# Patient Record
Sex: FEMALE | Race: WHITE | NOT HISPANIC OR LATINO | URBAN - METROPOLITAN AREA
[De-identification: names, ages, dates, MRNs, and addresses within clinical notes are randomized per-mention and may not be internally consistent; named-entity substitution may affect disease eponyms.]

---
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
## 2023-04-26 ENCOUNTER — TELEPHONE (OUTPATIENT)
Dept: PEDIATRICS CLINIC | Facility: CLINIC | Age: 23
End: 2023-04-26

## 2023-05-08 NOTE — TELEPHONE ENCOUNTER
05/08/23 12:37 PM        The office's request has been received, reviewed, and the patient chart updated  The PCP has successfully been removed with a patient attribution note  This message will now be completed          Thank you  Silas Alatorre

## 2023-06-21 ENCOUNTER — OFFICE VISIT (OUTPATIENT)
Dept: FAMILY MEDICINE CLINIC | Facility: CLINIC | Age: 23
End: 2023-06-21
Payer: COMMERCIAL

## 2023-06-21 VITALS
HEIGHT: 69 IN | BODY MASS INDEX: 20.44 KG/M2 | HEART RATE: 77 BPM | DIASTOLIC BLOOD PRESSURE: 70 MMHG | OXYGEN SATURATION: 97 % | SYSTOLIC BLOOD PRESSURE: 118 MMHG | TEMPERATURE: 97.2 F | RESPIRATION RATE: 18 BRPM | WEIGHT: 138 LBS

## 2023-06-21 DIAGNOSIS — B96.89 ACUTE BACTERIAL BRONCHITIS: ICD-10-CM

## 2023-06-21 DIAGNOSIS — J20.8 ACUTE BACTERIAL BRONCHITIS: ICD-10-CM

## 2023-06-21 DIAGNOSIS — Z00.01 ENCOUNTER FOR WELL ADULT EXAM WITH ABNORMAL FINDINGS: Primary | ICD-10-CM

## 2023-06-21 PROCEDURE — 99385 PREV VISIT NEW AGE 18-39: CPT | Performed by: NURSE PRACTITIONER

## 2023-06-21 RX ORDER — ALBUTEROL SULFATE 90 UG/1
2 AEROSOL, METERED RESPIRATORY (INHALATION) EVERY 4 HOURS PRN
Qty: 18 G | Refills: 3 | Status: SHIPPED | OUTPATIENT
Start: 2023-06-21

## 2023-06-21 RX ORDER — AZITHROMYCIN 250 MG/1
TABLET, FILM COATED ORAL
Qty: 6 TABLET | Refills: 0 | Status: SHIPPED | OUTPATIENT
Start: 2023-06-21 | End: 2023-06-25

## 2023-06-21 NOTE — PROGRESS NOTES
BMI Counseling: Body mass index is 20 38 kg/m²  The BMI is above normal  Nutrition recommendations include reducing portion sizes  Exercise recommendations include moderate aerobic physical activity for 150 minutes/week  ADULT ANNUAL [address] [facility]    NAME: Janet Oliver  AGE: 23 y o  SEX: female  : 2000     DATE: 2023     Assessment and Plan:     Problem List Items Addressed This Visit    None  Visit Diagnoses     Encounter for well adult exam with abnormal findings    -  Primary    Acute bacterial bronchitis        Relevant Medications    albuterol (ProAir HFA) 90 mcg/act inhaler    azithromycin (ZITHROMAX) 250 mg tablet          Immunizations and preventive care screenings were discussed with patient today  Appropriate education was printed on patient's after visit summary  Counseling:  Alcohol/drug use: discussed moderation in alcohol intake, the recommendations for healthy alcohol use, and avoidance of illicit drug use  Dental Health: discussed importance of regular tooth brushing, flossing, and dental visits  Injury prevention: discussed safety/seat belts, safety helmets, smoke detectors, carbon dioxide detectors, and smoking near bedding or upholstery  Sexual health: discussed sexually transmitted diseases, partner selection, use of condoms, avoidance of unintended pregnancy, and contraceptive alternatives  · Exercise: the importance of regular exercise/physical activity was discussed  Recommend exercise 3-5 times per week for at least 30 minutes  Depression Screening and Follow-up Plan: Patient was screened for depression during today's encounter  They screened negative with a PHQ-2 score of 0  No follow-ups on file  Chief Complaint:     Chief Complaint   Patient presents with   • Establish Care     Cough has baeen for the past month         History of Present Illness:     Adult Annual Physical Patient here for a comprehensive physical exam  The patient reports problems - URI like symptoms ongoing for one month  She reports taking cold and allergy medication  She reports congestion , chest congestion, no fever or chills  She is attending [employer]  Diet and Physical Activity  · Diet/Nutrition: well balanced diet  · Exercise: moderate cardiovascular exercise and strength training exercises  Depression Screening  PHQ-2/9 Depression Screening    Little interest or pleasure in doing things: 0 - not at all  Feeling down, depressed, or hopeless: 0 - not at all  PHQ-2 Score: 0  PHQ-2 Interpretation: Negative depression screen       General Health  · Sleep: gets 7-8 hours of sleep on average  · Hearing: normal - bilateral   · Vision: wears glasses  · Dental: regular dental visits  /GYN Health  · Last menstrual period: normal, regular  · Contraceptive method: none  · History of STDs?: no      Review of Systems:     Review of Systems   Constitutional: Negative for activity change, chills, fatigue and fever  HENT: Positive for congestion  Negative for ear discharge, ear pain, sinus pressure, sinus pain, sore throat, tinnitus and trouble swallowing  Eyes: Negative for photophobia, pain, discharge, itching and visual disturbance  Respiratory: Positive for cough  Negative for chest tightness, shortness of breath and wheezing  Cardiovascular: Negative for chest pain and leg swelling  Gastrointestinal: Negative for abdominal distention, abdominal pain, constipation, diarrhea, nausea and vomiting  Endocrine: Negative for polydipsia, polyphagia and polyuria  Genitourinary: Negative for dysuria and frequency  Musculoskeletal: Negative for arthralgias, myalgias, neck pain and neck stiffness  Skin: Negative for color change  Neurological: Negative for dizziness, syncope, weakness, numbness and headaches  Hematological: Does not bruise/bleed easily  Psychiatric/Behavioral: Negative for behavioral problems, confusion, self-injury, sleep disturbance and suicidal ideas  The patient is not nervous/anxious         Past Medical History:     Past Medical History:   Diagnosis Date   • Allergic rhinitis     LAST ASSESSED [id number]   • Asthma, well controlled     LAST ASSESSED 02OCT2014   • History of pneumonia     LAST ASSESSED [id number]   • Migraine     LAST ASSESSED [id number]   • Mild scoliosis     LAST ASSESSED NOV 2 2015   • Peanut allergy     LAST ASSESSED [id number]      Past Surgical History:     Past Surgical History:   Procedure Laterality Date   • NO PAST SURGERIES        Social History:     Social History     Socioeconomic History   • Marital status: Single     Spouse name: None   • Number of children: None   • Years of education: None   • Highest education level: None   Occupational History   • None   Tobacco Use   • Smoking status: Never   • Smokeless tobacco: Never   Vaping Use   • Vaping Use: Never used   Substance and Sexual Activity   • Alcohol use: No   • Drug use: No   • Sexual activity: Never   Other Topics Concern   • None   Social History Narrative        OLDER BROTHERS  1 moved to [place] after college  AND ONE IN THE [employer]     LIVES WITH PARENTS         Social Determinants of Health     Financial Resource Strain: Not on file   Food Insecurity: Not on file   Transportation Needs: Not on file   Physical Activity: Not on file   Stress: Not on file   Social Connections: Not on file   Intimate Partner Violence: Not on file   Housing Stability: Not on file      Family History:     Family History   Problem Relation Age of Onset   • Hyperlipidemia Father    • Hypertension Father    • Aneurysm Cousin         AS A TEENAGER   • Diabetes Family    • Hypertension Mother    • Hyperlipidemia Mother    • No Known Problems Brother    • No Known Problems Brother    • Addiction problem Paternal Grandfather         alcohol   • Mental illness Neg Hx       Current "Medications:     Current Outpatient Medications   Medication Sig Dispense Refill   • albuterol (ProAir HFA) 90 mcg/act inhaler Inhale 2 puffs every 4 (four) hours as needed for wheezing 18 g 3   • azithromycin (ZITHROMAX) 250 mg tablet Take 2 tablets today then 1 tablet daily x 4 days 6 tablet 0   • clindamycin-benzoyl peroxide (BENZACLIN) gel APPLY TO FACE ONCE A DAY     • EPINEPHrine (EPIPEN) 0 3 mg/0 3 mL SOAJ Inject 0 3 mL (0 3 mg total) into a muscle once for 1 dose 2 2 packs, generic OK 2 each 1   • loratadine (CLARITIN) 10 mg tablet 1 tab(s)     • tretinoin (REFISSA) 0 05 % cream Apply topically daily at bedtime 45 g 0     No current facility-administered medications for this visit  Allergies: Allergies   Allergen Reactions   • Amoxicillin-Pot Clavulanate    • Peanut Oil - Food Allergy Other (See Comments)   • Shellfish-Derived Products - Food Allergy Tongue Swelling      Physical Exam:     /70 (BP Location: Left arm, Patient Position: Sitting, Cuff Size: Standard)   Pulse 77   Temp (!) 97 2 °F (36 2 °C) (Tympanic)   Resp 18   Ht 5' 9\" (1 753 m)   Wt 62 6 kg (138 lb)   SpO2 97%   BMI 20 38 kg/m²     Physical Exam  Vitals and nursing note reviewed  Constitutional:       Appearance: Normal appearance  She is well-developed  HENT:      Head: Normocephalic and atraumatic  Right Ear: Tympanic membrane normal       Left Ear: Tympanic membrane normal       Nose: Congestion present  Mouth/Throat:      Mouth: Mucous membranes are moist    Eyes:      Extraocular Movements: Extraocular movements intact  Pupils: Pupils are equal, round, and reactive to light  Cardiovascular:      Rate and Rhythm: Normal rate and regular rhythm  Pulses: Normal pulses  Heart sounds: Normal heart sounds  Pulmonary:      Breath sounds: Wheezing present  No rhonchi  Comments: Moist cough   Abdominal:      General: There is no distension  Tenderness:  There is no abdominal " tenderness  Musculoskeletal:         General: No swelling, tenderness, deformity or signs of injury  Normal range of motion  Cervical back: Normal range of motion and neck supple  Right lower leg: No edema  Left lower leg: No edema  Skin:     General: Skin is warm  Findings: No bruising, erythema, lesion or rash  Neurological:      General: No focal deficit present  Mental Status: She is alert and oriented to person, place, and time  Psychiatric:         Mood and Affect: Mood normal          Behavior: Behavior normal          Thought Content:  Thought content normal          Judgment: Judgment normal           Barbara Duarte, [address]

## 2023-11-25 ENCOUNTER — OFFICE VISIT (OUTPATIENT)
Dept: URGENT CARE | Facility: CLINIC | Age: 23
End: 2023-11-25
Payer: COMMERCIAL

## 2023-11-25 VITALS
WEIGHT: 134 LBS | DIASTOLIC BLOOD PRESSURE: 81 MMHG | BODY MASS INDEX: 19.79 KG/M2 | TEMPERATURE: 98.1 F | OXYGEN SATURATION: 98 % | RESPIRATION RATE: 16 BRPM | SYSTOLIC BLOOD PRESSURE: 133 MMHG | HEART RATE: 108 BPM

## 2023-11-25 DIAGNOSIS — J02.0 STREP PHARYNGITIS: Primary | ICD-10-CM

## 2023-11-25 DIAGNOSIS — J02.9 SORE THROAT: ICD-10-CM

## 2023-11-25 LAB — S PYO AG THROAT QL: POSITIVE

## 2023-11-25 PROCEDURE — G0382 LEV 3 HOSP TYPE B ED VISIT: HCPCS | Performed by: ORTHOPAEDIC SURGERY

## 2023-11-25 PROCEDURE — 87880 STREP A ASSAY W/OPTIC: CPT | Performed by: ORTHOPAEDIC SURGERY

## 2023-11-25 RX ORDER — GUAIFENESIN 600 MG/1
1200 TABLET, EXTENDED RELEASE ORAL EVERY 12 HOURS SCHEDULED
COMMUNITY

## 2023-11-25 RX ORDER — AZITHROMYCIN 250 MG/1
TABLET, FILM COATED ORAL
Qty: 6 TABLET | Refills: 0 | Status: SHIPPED | OUTPATIENT
Start: 2023-11-25 | End: 2023-11-29

## 2023-11-25 NOTE — PATIENT INSTRUCTIONS
Strep group A was positive today in clinic. Please take your prescription as prescribed.    For pain relief you may try:  Warm water and salt gargles  Chloraseptic spray  Cepacol lozenges  "Throat Coat" tea  Over-the-counter Tylenol/ibuprofen for pain and fever  Stay well hydrated and get plenty of rest  Follow up with your PCP in 3-5 days  Proceed to ER if symptoms worsen

## 2023-11-26 NOTE — PROGRESS NOTES
[facility]        NAME: Madelin Gardner is a 26 y.o. female  : 2000    MRN: [id number]  DATE: 2023  TIME: 4:44 PM    Assessment and Plan   Strep pharyngitis [J02.0]  1. Strep pharyngitis  azithromycin (ZITHROMAX) 250 mg tablet      2. Sore throat  POCT rapid strepA        POCT strep positive. Patient Instructions     Strep group A was positive today in clinic. Please take your prescription as prescribed. For pain relief you may try:  Warm water and salt gargles  Chloraseptic spray  Cepacol lozenges  "Throat Coat" tea  Over-the-counter Tylenol/ibuprofen for pain and fever  Stay well hydrated and get plenty of rest  Follow up with your PCP in 3-5 days  Proceed to ER if symptoms worsen    Chief Complaint     Chief Complaint   Patient presents with    Sore Throat     Sore throat beginning of oct. Tested positive for strep B, prescribed antibiotic. Still had sore throat beginning of Nov, tested positive for strep B on , was prescribed ABX and completed course. Felt sore throat was improving but last few days has sore throat again. History of Present Illness       26 YOF presents to the urgent care for evaluation of a sore throat. The patient was last treated for strep B on 2023, and notes another strep B infection just prior to that. She was treated with Keflex first and then Doxycycline, as she is allergic to [facility]. The patient states that both times her symptoms had completely resolved. Her sore throat today started this past week. She notes 102F fever and chills this morning. No n/v/d. No significant cough. For symptom relief she has used Mucinex and allergy medication. The patient is in college. Review of Systems   Review of Systems   Constitutional:  Positive for chills and fever. HENT:  Positive for congestion and sore throat. Negative for ear pain. Eyes:  Negative for pain and visual disturbance.    Respiratory:  Negative for cough and shortness of breath. Cardiovascular:  Negative for chest pain and palpitations. Gastrointestinal:  Negative for abdominal pain and vomiting. Genitourinary:  Negative for dysuria and hematuria. Musculoskeletal:  Negative for arthralgias and back pain. Skin:  Negative for color change and rash. Neurological:  Negative for dizziness, seizures, syncope, weakness, light-headedness and headaches. All other systems reviewed and are negative.         Current Medications       Current Outpatient Medications:     albuterol (ProAir HFA) 90 mcg/act inhaler, Inhale 2 puffs every 4 (four) hours as needed for wheezing, Disp: 18 g, Rfl: 3    azithromycin (ZITHROMAX) 250 mg tablet, Take 2 tablets today then 1 tablet daily x 4 days, Disp: 6 tablet, Rfl: 0    EPINEPHrine (EPIPEN) 0.3 mg/0.3 mL SOAJ, Inject 0.3 mL (0.3 mg total) into a muscle once for 1 dose 2 2 packs, generic OK, Disp: 2 each, Rfl: 1    guaiFENesin (MUCINEX) 600 mg 12 hr tablet, Take 1,200 mg by mouth every 12 (twelve) hours, Disp: , Rfl:     tretinoin (REFISSA) 0.05 % cream, Apply topically daily at bedtime, Disp: 45 g, Rfl: 0    clindamycin-benzoyl peroxide (BENZACLIN) gel, APPLY TO FACE ONCE A DAY, Disp: , Rfl:     loratadine (CLARITIN) 10 mg tablet, 1 tab(s) (Patient not taking: Reported on 11/25/2023), Disp: , Rfl:     Current Allergies     Allergies as of 11/25/2023 - Reviewed 11/25/2023   Allergen Reaction Noted    Amoxicillin-pot clavulanate  04/10/2014    Peanut oil - food allergy Other (See Comments) 12/16/2013    Shellfish-derived products - food allergy Tongue Swelling 06/21/2023            The following portions of the patient's history were reviewed and updated as appropriate: allergies, current medications, past family history, past medical history, past social history, past surgical history and problem list.     Past Medical History:   Diagnosis Date    Allergic rhinitis     LAST ASSESSED [id number]    Asthma, well controlled     LAST ASSESSED [id number]    History of pneumonia     LAST ASSESSED [id number]    Migraine     LAST ASSESSED [id number]    Mild scoliosis     LAST ASSESSED NOV 2 2015    Peanut allergy     LAST ASSESSED [id number]       Past Surgical History:   Procedure Laterality Date    NO PAST SURGERIES      WISDOM TOOTH EXTRACTION  08/2023       Family History   Problem Relation Age of Onset    Hyperlipidemia Father     Hypertension Father     Aneurysm Cousin         AS A TEENAGER    Diabetes Family     Hypertension Mother     Hyperlipidemia Mother     No Known Problems Brother     No Known Problems Brother     Addiction problem Paternal Grandfather         alcohol    Mental illness Neg Hx          Medications have been verified. Objective   /81   Pulse (!) 108   Temp 98.1 °F (36.7 °C)   Resp 16   Wt 60.8 kg (134 lb)   LMP 11/18/2023 (Approximate)   SpO2 98%   BMI 19.79 kg/m²        Physical Exam     Physical Exam  Vitals and nursing note reviewed. Constitutional:       General: She is not in acute distress. Appearance: Normal appearance. She is well-developed and normal weight. She is not ill-appearing or toxic-appearing. HENT:      Head: Normocephalic and atraumatic. Right Ear: Tympanic membrane normal.      Left Ear: Tympanic membrane normal.      Nose: Nose normal. No congestion. Mouth/Throat:      Mouth: Mucous membranes are moist.      Pharynx: Pharyngeal swelling and posterior oropharyngeal erythema present. Tonsils: No tonsillar exudate. 1+ on the right. 1+ on the left. Eyes:      Extraocular Movements: Extraocular movements intact. Pupils: Pupils are equal, round, and reactive to light. Cardiovascular:      Rate and Rhythm: Normal rate and regular rhythm. Pulses: Normal pulses. Heart sounds: Normal heart sounds. Pulmonary:      Effort: Pulmonary effort is normal. No respiratory distress. Breath sounds: Normal breath sounds. No wheezing or rhonchi.    Abdominal: Palpations: Abdomen is soft. Musculoskeletal:      Cervical back: Normal range of motion. Lymphadenopathy:      Cervical: No cervical adenopathy. Skin:     General: Skin is warm and dry. Capillary Refill: Capillary refill takes less than 2 seconds. Neurological:      General: No focal deficit present. Mental Status: She is alert and oriented to person, place, and time.    Psychiatric:         Mood and Affect: Mood normal.         Behavior: Behavior normal.

## 2023-12-04 ENCOUNTER — OFFICE VISIT (OUTPATIENT)
Dept: FAMILY MEDICINE CLINIC | Facility: CLINIC | Age: 23
End: 2023-12-04
Payer: COMMERCIAL

## 2023-12-04 VITALS — HEART RATE: 86 BPM | RESPIRATION RATE: 20 BRPM | TEMPERATURE: 97.2 F | OXYGEN SATURATION: 97 %

## 2023-12-04 DIAGNOSIS — J06.9 ACUTE URI: Primary | ICD-10-CM

## 2023-12-04 DIAGNOSIS — J45.20 ASTHMA, MILD INTERMITTENT, WELL-CONTROLLED: ICD-10-CM

## 2023-12-04 DIAGNOSIS — J02.0 STREP PHARYNGITIS: ICD-10-CM

## 2023-12-04 LAB
SARS-COV-2 AG UPPER RESP QL IA: NEGATIVE
VALID CONTROL: NORMAL

## 2023-12-04 PROCEDURE — 87811 SARS-COV-2 COVID19 W/OPTIC: CPT | Performed by: NURSE PRACTITIONER

## 2023-12-04 PROCEDURE — 99214 OFFICE O/P EST MOD 30 MIN: CPT | Performed by: NURSE PRACTITIONER

## 2023-12-04 RX ORDER — FLUTICASONE PROPIONATE 50 MCG
1 SPRAY, SUSPENSION (ML) NASAL DAILY
Qty: 9.9 ML | Refills: 0 | Status: SHIPPED | OUTPATIENT
Start: 2023-12-04

## 2023-12-04 RX ORDER — LEVOFLOXACIN 500 MG/1
500 TABLET, FILM COATED ORAL EVERY 24 HOURS
Qty: 7 TABLET | Refills: 0 | Status: SHIPPED | OUTPATIENT
Start: 2023-12-04 | End: 2023-12-11

## 2023-12-04 NOTE — ASSESSMENT & PLAN NOTE
You have been prescribed an antibiotic. This medication is used to treat bacterial infections. Follow the directions as prescribed. Do not share this medication with anyone. Do not stop taking her medication until it is finished, even if you are feeling better. Taking medication with a full glass of water. Call the office if you experience any possible side effects. I recommend that the patient takes an over the counter probiotic or eats yogurt with live cultures in it [place]) to keep good bacteria in the gut and help prevent diarrhea. Wash hands frequently to prevent the spread of infection. Ibuprofen and/or tylenol as needed for pain or fever. If not improving over the next 7-10 days, call office.

## 2023-12-04 NOTE — PROGRESS NOTES
OFFICE VISIT  Raheem Fuller 26 y.o. female MRN: [id number]          Assessment / Plan:  Problem List Items Addressed This Visit          Digestive    Strep pharyngitis     Recently treated with zithromax from urgent care. Remains with lingering symptoms          Relevant Medications    levofloxacin (LEVAQUIN) 500 mg tablet       Respiratory    Asthma, well controlled     No use of inhaler during this acute flare up. Does have albuterol, may use prn. Acute URI - Primary     You have been prescribed an antibiotic. This medication is used to treat bacterial infections. Follow the directions as prescribed. Do not share this medication with anyone. Do not stop taking her medication until it is finished, even if you are feeling better. Taking medication with a full glass of water. Call the office if you experience any possible side effects. I recommend that the patient takes an over the counter probiotic or eats yogurt with live cultures in it [place]) to keep good bacteria in the gut and help prevent diarrhea. Wash hands frequently to prevent the spread of infection. Ibuprofen and/or tylenol as needed for pain or fever. If not improving over the next 7-10 days, call office. Relevant Medications    levofloxacin (LEVAQUIN) 500 mg tablet    fluticasone (FLONASE) 50 mcg/act nasal spray    Other Relevant Orders    POCT Rapid Covid Ag (Completed)         Reason For Visit / Chief Complaint  Chief Complaint   Patient presents with    Sore Throat    Nasal Congestion     Started Saturday after thanksgiving        HPI:  Raheem Fuller is a 26 y.o. female who presents today for continuing cold like symptoms. She reports a sore  throat, congestion, cough. remains running low grade fever, feeling tired, used keflex  Oct 9th.  Then seen in urgent care 9 days ago      Historical Information   Past Medical History:   Diagnosis Date    Allergic rhinitis     LAST ASSESSED [id number]    Asthma, well controlled LAST ASSESSED 02OCT2014    History of pneumonia     LAST ASSESSED [id number]    Migraine     LAST ASSESSED [id number]    Mild scoliosis     LAST ASSESSED NOV 2 2015    Peanut allergy     LAST ASSESSED [id number]     Past Surgical History:   Procedure Laterality Date    NO PAST SURGERIES      WISDOM TOOTH EXTRACTION  08/2023     Social History   Social History     Substance and Sexual Activity   Alcohol Use No     Social History     Substance and Sexual Activity   Drug Use No     Social History     Tobacco Use   Smoking Status Never   Smokeless Tobacco Never     Family History   Problem Relation Age of Onset    Hyperlipidemia Father     Hypertension Father     Aneurysm Cousin         AS A TEENAGER    Diabetes Family     Hypertension Mother     Hyperlipidemia Mother     No Known Problems Brother     No Known Problems Brother     Addiction problem Paternal Grandfather         alcohol    Mental illness Neg Hx        Meds/Allergies   Allergies   Allergen Reactions    Amoxicillin-Pot Clavulanate     Peanut Oil - Food Allergy Other (See Comments)    Shellfish-Derived Products - Food Allergy Tongue Swelling       Meds:    Current Outpatient Medications:     fluticasone (FLONASE) 50 mcg/act nasal spray, 1 spray into each nostril daily, Disp: 9.9 mL, Rfl: 0    levofloxacin (LEVAQUIN) 500 mg tablet, Take 1 tablet (500 mg total) by mouth every 24 hours for 7 days, Disp: 7 tablet, Rfl: 0    albuterol (ProAir HFA) 90 mcg/act inhaler, Inhale 2 puffs every 4 (four) hours as needed for wheezing, Disp: 18 g, Rfl: 3    clindamycin-benzoyl peroxide (BENZACLIN) gel, APPLY TO FACE ONCE A DAY, Disp: , Rfl:     EPINEPHrine (EPIPEN) 0.3 mg/0.3 mL SOAJ, Inject 0.3 mL (0.3 mg total) into a muscle once for 1 dose 2 2 packs, generic OK, Disp: 2 each, Rfl: 1    guaiFENesin (MUCINEX) 600 mg 12 hr tablet, Take 1,200 mg by mouth every 12 (twelve) hours, Disp: , Rfl:     loratadine (CLARITIN) 10 mg tablet, 1 tab(s) (Patient not taking: Reported on 11/25/2023), Disp: , Rfl:     tretinoin (REFISSA) 0.05 % cream, Apply topically daily at bedtime, Disp: 45 g, Rfl: 0      REVIEW OF SYSTEMS  Review of Systems   Constitutional:  Positive for activity change, fatigue and fever. Negative for chills. HENT:  Positive for congestion, ear pain, sinus pressure, sinus pain and sore throat. Negative for ear discharge, tinnitus and trouble swallowing. Eyes:  Negative for photophobia, pain, discharge, itching and visual disturbance. Respiratory:  Negative for cough, chest tightness, shortness of breath and wheezing. Cardiovascular:  Negative for chest pain and leg swelling. Gastrointestinal:  Negative for abdominal distention, abdominal pain, constipation, diarrhea, nausea and vomiting. Endocrine: Negative for polydipsia, polyphagia and polyuria. Genitourinary:  Negative for dysuria and frequency. Musculoskeletal:  Negative for arthralgias, myalgias, neck pain and neck stiffness. Skin:  Negative for color change. Neurological:  Negative for dizziness, syncope, weakness, numbness and headaches. Hematological:  Does not bruise/bleed easily. Psychiatric/Behavioral:  Negative for behavioral problems, confusion, self-injury, sleep disturbance and suicidal ideas. The patient is not nervous/anxious. Current Vitals:   Pulse: 86 (12/04/23 0931)  Temperature: (!) 97.2 °F (36.2 °C) (12/04/23 0931)  Temp Source: Tympanic (12/04/23 0931)  Respirations: 20 (12/04/23 0931)  SpO2: 97 % (12/04/23 0931)  [unfilled]    PHYSICAL EXAMS:  Physical Exam  Vitals and nursing note reviewed. Constitutional:       Appearance: Normal appearance. She is well-developed. She is ill-appearing. HENT:      Head: Normocephalic and atraumatic. Right Ear: Tympanic membrane and ear canal normal. No drainage, swelling or tenderness. Left Ear: Tympanic membrane and ear canal normal. No drainage, swelling or tenderness. Nose: Congestion present. Mouth/Throat:      Pharynx: Posterior oropharyngeal erythema present. Tonsils: 0 on the right. 0 on the left. Eyes:      Conjunctiva/sclera: Conjunctivae normal.      Pupils: Pupils are equal, round, and reactive to light. Cardiovascular:      Rate and Rhythm: Normal rate and regular rhythm. Pulmonary:      Effort: Pulmonary effort is normal.      Breath sounds: Normal breath sounds. No wheezing or rhonchi. Abdominal:      General: There is no distension. Palpations: Abdomen is soft. Tenderness: There is no abdominal tenderness. Musculoskeletal:         General: No swelling, tenderness, deformity or signs of injury. Cervical back: Normal range of motion and neck supple. Right lower leg: No edema. Left lower leg: No edema. Skin:     Findings: No bruising, erythema, lesion or rash. Neurological:      General: No focal deficit present. Mental Status: She is alert and oriented to person, place, and time. Psychiatric:         Mood and Affect: Mood normal.         Behavior: Behavior normal.         Thought Content: Thought content normal.         Judgment: Judgment normal.             Lab, imaging and other studies: I have personally reviewed pertinent reports. Chon Vaughn

## 2024-02-02 PROBLEM — J06.9 ACUTE URI: Status: RESOLVED | Noted: 2023-12-04 | Resolved: 2024-02-02

## 2024-06-25 ENCOUNTER — OFFICE VISIT (OUTPATIENT)
Dept: FAMILY MEDICINE CLINIC | Facility: CLINIC | Age: 24
End: 2024-06-25
Payer: COMMERCIAL

## 2024-06-25 VITALS
HEIGHT: 69 IN | SYSTOLIC BLOOD PRESSURE: 120 MMHG | OXYGEN SATURATION: 97 % | BODY MASS INDEX: 20.65 KG/M2 | RESPIRATION RATE: 18 BRPM | DIASTOLIC BLOOD PRESSURE: 62 MMHG | WEIGHT: 139.4 LBS | TEMPERATURE: 98.4 F | HEART RATE: 72 BPM

## 2024-06-25 DIAGNOSIS — L24.9 IRRITANT CONTACT DERMATITIS, UNSPECIFIED TRIGGER: ICD-10-CM

## 2024-06-25 DIAGNOSIS — Z00.00 ENCOUNTER FOR WELL ADULT EXAM WITHOUT ABNORMAL FINDINGS: Primary | ICD-10-CM

## 2024-06-25 DIAGNOSIS — Z91.010 PEANUT ALLERGY: ICD-10-CM

## 2024-06-25 PROCEDURE — 99395 PREV VISIT EST AGE 18-39: CPT | Performed by: NURSE PRACTITIONER

## 2024-06-25 RX ORDER — TRIAMCINOLONE ACETONIDE 1 MG/G
CREAM TOPICAL
COMMUNITY
Start: 2024-03-19 | End: 2024-06-25 | Stop reason: SDUPTHER

## 2024-06-25 RX ORDER — TRIAMCINOLONE ACETONIDE 1 MG/G
CREAM TOPICAL 2 TIMES DAILY
Qty: 15 G | Refills: 0 | Status: SHIPPED | OUTPATIENT
Start: 2024-06-25

## 2024-06-25 RX ORDER — EPINEPHRINE 0.3 MG/.3ML
0.3 INJECTION SUBCUTANEOUS ONCE
Qty: 1 EACH | Refills: 1 | Status: SHIPPED | OUTPATIENT
Start: 2024-06-25 | End: 2024-06-25

## 2024-06-25 RX ORDER — ERYTHROMYCIN 20 MG/G
GEL TOPICAL
COMMUNITY
Start: 2024-05-13

## 2024-06-25 NOTE — PROGRESS NOTES
Adult Annual Physical  Name: Samantha Allen      : 2000      MRN: [id number]  Encounter Provider: COOKIE Curran  Encounter Date: 2024   Encounter department: [facility]    Assessment & Plan   1. Encounter for well adult exam without abnormal findings  2. Peanut allergy  -     EPINEPHrine (EPIPEN) 0.3 mg/0.3 mL SOAJ; Inject 0.3 mL (0.3 mg total) into a muscle once for 1 dose 2 2 packs, generic OK  3. Irritant contact dermatitis, unspecified trigger  -     triamcinolone (KENALOG) 0.1 % cream; Apply topically 2 (two) times a day    Immunizations and preventive care screenings were discussed with patient today. Appropriate education was printed on patient's after visit summary.    Counseling:  Alcohol/drug use: discussed moderation in alcohol intake, the recommendations for healthy alcohol use, and avoidance of illicit drug use.  Dental Health: discussed importance of regular tooth brushing, flossing, and dental visits.  Injury prevention: discussed safety/seat belts, safety helmets, smoke detectors, carbon dioxide detectors, and smoking near bedding or upholstery.  Sexual health: discussed sexually transmitted diseases, partner selection, use of condoms, avoidance of unintended pregnancy, and contraceptive alternatives.  Exercise: the importance of regular exercise/physical activity was discussed. Recommend exercise 3-5 times per week for at least 30 minutes.       Depression Screening and Follow-up Plan: Patient was screened for depression during today's encounter. They screened negative with a PHQ-2 score of 0.        History of Present Illness     Adult Annual Physical:  Patient presents for annual physical.     Diet and Physical Activity:  - Diet/Nutrition: well balanced diet.  - Exercise: walking.    Depression Screening:  - PHQ-2 Score: 0    General Health:  - Sleep: 7-8 hours of sleep on average.  - Hearing: normal hearing right ear and normal hearing left  "ear.  - Vision: vision problems. does have glasses does not wear routinely  - Dental: regular dental visits.    /GYN Health:  - Follows with GYN: no.   - Menopause: perimenopausal.   - Last menstrual cycle: 6/1/2024.   - History of STDs: no    Advanced Care Planning:  - Has an advanced directive?: no    - Has a durable medical POA?: no    - ACP document given to patient?: no      Review of Systems   Constitutional:  Negative for activity change, chills, fatigue and fever.   HENT:  Negative for congestion, ear discharge, ear pain, sinus pressure, sinus pain, sore throat, tinnitus and trouble swallowing.    Eyes:  Negative for photophobia, pain, discharge, itching and visual disturbance.   Respiratory:  Negative for cough, chest tightness, shortness of breath and wheezing.    Cardiovascular:  Negative for chest pain and leg swelling.   Gastrointestinal:  Negative for abdominal distention, abdominal pain, constipation, diarrhea, nausea and vomiting.   Endocrine: Negative for polydipsia, polyphagia and polyuria.   Genitourinary:  Negative for dysuria and frequency.   Musculoskeletal:  Negative for arthralgias, myalgias, neck pain and neck stiffness.   Skin:  Negative for color change.   Neurological:  Negative for dizziness, syncope, weakness, numbness and headaches.   Hematological:  Does not bruise/bleed easily.   Psychiatric/Behavioral:  Negative for behavioral problems, confusion, self-injury, sleep disturbance and suicidal ideas. The patient is not nervous/anxious.          Objective     /62 (BP Location: Left arm, Patient Position: Sitting, Cuff Size: Standard)   Pulse 72   Temp 98.4 °F (36.9 °C) (Tympanic)   Resp 18   Ht 5' 9\" (1.753 m)   Wt 63.2 kg (139 lb 6.4 oz)   SpO2 97%   BMI 20.59 kg/m²     Physical Exam  Vitals and nursing note reviewed.   Constitutional:       Appearance: Normal appearance. She is well-developed.   HENT:      Head: Normocephalic and atraumatic.      Right Ear: Tympanic " membrane, ear canal and external ear normal.      Left Ear: Tympanic membrane, ear canal and external ear normal.      Nose: Nose normal.      Mouth/Throat:      Mouth: Mucous membranes are moist.   Eyes:      Extraocular Movements: Extraocular movements intact.      Conjunctiva/sclera: Conjunctivae normal.      Pupils: Pupils are equal, round, and reactive to light.   Cardiovascular:      Rate and Rhythm: Normal rate and regular rhythm.      Pulses: Normal pulses.      Heart sounds: Normal heart sounds.   Pulmonary:      Effort: Pulmonary effort is normal.      Breath sounds: Normal breath sounds. No wheezing or rhonchi.   Abdominal:      General: There is no distension.      Palpations: Abdomen is soft.      Tenderness: There is no abdominal tenderness.   Musculoskeletal:         General: No swelling, tenderness, deformity or signs of injury. Normal range of motion.      Cervical back: Normal range of motion and neck supple.      Right lower leg: No edema.      Left lower leg: No edema.   Skin:     General: Skin is warm.      Findings: No bruising, erythema, lesion or rash.   Neurological:      General: No focal deficit present.      Mental Status: She is alert and oriented to person, place, and time.   Psychiatric:         Mood and Affect: Mood normal.         Behavior: Behavior normal.         Thought Content: Thought content normal.         Judgment: Judgment normal.       Administrative Statements

## 2025-06-02 ENCOUNTER — OFFICE VISIT (OUTPATIENT)
Dept: FAMILY MEDICINE CLINIC | Facility: CLINIC | Age: 25
End: 2025-06-02
Payer: COMMERCIAL

## 2025-06-02 VITALS
TEMPERATURE: 97.9 F | HEART RATE: 71 BPM | OXYGEN SATURATION: 98 % | DIASTOLIC BLOOD PRESSURE: 73 MMHG | BODY MASS INDEX: 21.34 KG/M2 | WEIGHT: 140.8 LBS | HEIGHT: 68 IN | RESPIRATION RATE: 16 BRPM | SYSTOLIC BLOOD PRESSURE: 127 MMHG

## 2025-06-02 DIAGNOSIS — J45.20 ASTHMA, MILD INTERMITTENT, WELL-CONTROLLED: ICD-10-CM

## 2025-06-02 DIAGNOSIS — Z00.00 ENCOUNTER FOR WELL ADULT EXAM WITHOUT ABNORMAL FINDINGS: Primary | ICD-10-CM

## 2025-06-02 PROCEDURE — 99395 PREV VISIT EST AGE 18-39: CPT | Performed by: NURSE PRACTITIONER

## 2025-06-02 RX ORDER — ISOTRETINOIN 30 MG/1
CAPSULE ORAL
COMMUNITY
Start: 2025-03-07

## 2025-06-02 NOTE — PROGRESS NOTES
Adult Annual Physical  Name: Samantha Allen      : 2000      MRN: [id number]  Encounter Provider: Barbara Duarte DNP  Encounter Date: 2025   Encounter department: [facility]    :  Assessment & Plan  Encounter for well adult exam without abnormal findings         Asthma, mild intermittent, well-controlled  No flare ups                Immunizations:  - Immunizations due: Prevnar 20, Tdap and HPV (Gardasil 9)         History of Present Illness     Adult Annual Physical:  Patient presents for annual physical.     Diet and Physical Activity:  - Diet/Nutrition: well balanced diet.  - Exercise: moderate cardiovascular exercise.    Depression Screening:  - PHQ-2 Score: 0    General Health:  - Sleep: > 8 hours of sleep on average.  - Hearing: normal hearing right ear and normal hearing left ear.  - Vision: no vision problems.  - Dental: regular dental visits.    /GYN Health:  - Follows with GYN: yes.   - Menopause: premenopausal.   - History of STDs: no    Advanced Care Planning:  - Has an advanced directive?: no    - Has a durable medical POA?: no    - ACP document given to patient?: no      Review of Systems   Constitutional:  Negative for activity change, chills, fatigue and fever.   HENT:  Negative for congestion, ear discharge, ear pain, sinus pressure, sinus pain, sore throat, tinnitus and trouble swallowing.    Eyes:  Negative for photophobia, pain, discharge, itching and visual disturbance.   Respiratory:  Negative for cough, chest tightness, shortness of breath and wheezing.    Cardiovascular:  Negative for chest pain and leg swelling.   Gastrointestinal:  Negative for abdominal distention, abdominal pain, constipation, diarrhea, nausea and vomiting.   Endocrine: Negative for polydipsia, polyphagia and polyuria.   Genitourinary:  Negative for dysuria and frequency.   Musculoskeletal:  Negative for arthralgias, myalgias, neck pain and neck stiffness.   Skin:  Negative for  "color change.   Neurological:  Negative for dizziness, syncope, weakness, numbness and headaches.   Hematological:  Does not bruise/bleed easily.   Psychiatric/Behavioral:  Negative for behavioral problems, confusion, self-injury, sleep disturbance and suicidal ideas. The patient is not nervous/anxious.          Objective   /73 (BP Location: Left arm, Patient Position: Sitting, Cuff Size: Standard)   Pulse 71   Temp 97.9 °F (36.6 °C) (Tympanic)   Resp 16   Ht 5' 8.15\" (1.731 m)   Wt 63.9 kg (140 lb 12.8 oz)   SpO2 98%   BMI 21.31 kg/m²     Physical Exam  Vitals and nursing note reviewed.   Constitutional:       Appearance: Normal appearance. She is well-developed.   HENT:      Head: Normocephalic and atraumatic.     Cardiovascular:      Rate and Rhythm: Normal rate and regular rhythm.      Pulses: Normal pulses.      Heart sounds: Normal heart sounds.   Pulmonary:      Effort: Pulmonary effort is normal.      Breath sounds: Normal breath sounds. No wheezing or rhonchi.   Abdominal:      General: There is no distension.      Tenderness: There is no abdominal tenderness.     Musculoskeletal:         General: No swelling, tenderness, deformity or signs of injury. Normal range of motion.      Cervical back: Normal range of motion and neck supple.      Right lower leg: No edema.      Left lower leg: No edema.     Skin:     General: Skin is warm.      Findings: No bruising, erythema, lesion or rash.     Neurological:      General: No focal deficit present.      Mental Status: She is alert and oriented to person, place, and time.     Psychiatric:         Mood and Affect: Mood normal.         Behavior: Behavior normal.         Thought Content: Thought content normal.         Judgment: Judgment normal.         "

## 2025-06-23 ENCOUNTER — TELEPHONE (OUTPATIENT)
Age: 25
End: 2025-06-23

## 2025-06-24 ENCOUNTER — TELEPHONE (OUTPATIENT)
Dept: FAMILY MEDICINE CLINIC | Facility: CLINIC | Age: 25
End: 2025-06-24

## 2025-06-24 DIAGNOSIS — Z11.1 SCREENING FOR TUBERCULOSIS: Primary | ICD-10-CM

## 2025-06-25 ENCOUNTER — CLINICAL SUPPORT (OUTPATIENT)
Dept: FAMILY MEDICINE CLINIC | Facility: CLINIC | Age: 25
End: 2025-06-25
Payer: COMMERCIAL

## 2025-06-25 DIAGNOSIS — Z11.1 ENCOUNTER FOR TB TINE TEST: Primary | ICD-10-CM

## 2025-06-25 PROCEDURE — 86580 TB INTRADERMAL TEST: CPT

## 2025-06-27 LAB
INDURATION: 0 MM
TB SKIN TEST: NEGATIVE

## 2025-07-03 ENCOUNTER — TELEPHONE (OUTPATIENT)
Dept: FAMILY MEDICINE CLINIC | Facility: CLINIC | Age: 25
End: 2025-07-03

## 2025-07-07 ENCOUNTER — CLINICAL SUPPORT (OUTPATIENT)
Dept: FAMILY MEDICINE CLINIC | Facility: CLINIC | Age: 25
End: 2025-07-07
Payer: COMMERCIAL

## 2025-07-07 DIAGNOSIS — Z23 ENCOUNTER FOR IMMUNIZATION: ICD-10-CM

## 2025-07-07 DIAGNOSIS — Z11.1 ENCOUNTER FOR PPD TEST: Primary | ICD-10-CM

## 2025-07-07 PROCEDURE — 90715 TDAP VACCINE 7 YRS/> IM: CPT

## 2025-07-07 PROCEDURE — 86580 TB INTRADERMAL TEST: CPT

## 2025-07-07 PROCEDURE — 90471 IMMUNIZATION ADMIN: CPT

## 2025-07-09 ENCOUNTER — CLINICAL SUPPORT (OUTPATIENT)
Dept: FAMILY MEDICINE CLINIC | Facility: CLINIC | Age: 25
End: 2025-07-09

## 2025-07-09 DIAGNOSIS — Z11.1 PPD NEGATIVE: Primary | ICD-10-CM

## 2025-07-09 LAB
INDURATION: 0 MM
TB SKIN TEST: NEGATIVE

## 2025-07-09 PROCEDURE — NURSE

## 2025-07-22 ENCOUNTER — TELEPHONE (OUTPATIENT)
Age: 25
End: 2025-07-22

## 2025-07-22 NOTE — TELEPHONE ENCOUNTER
Lm for patient to make her aware TB test results have been printed and up at the  for her   
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  
Detail Level: Simple
Additional Notes: Patient will need to be scheduled for another blue light treatment of the scalp
Render Risk Assessment In Note?: no